# Patient Record
Sex: MALE | Race: ASIAN | HISPANIC OR LATINO | Employment: STUDENT | URBAN - METROPOLITAN AREA
[De-identification: names, ages, dates, MRNs, and addresses within clinical notes are randomized per-mention and may not be internally consistent; named-entity substitution may affect disease eponyms.]

---
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
## 2019-01-02 ENCOUNTER — COMMUNICATION - HEALTHEAST (OUTPATIENT)
Dept: PEDIATRICS | Facility: CLINIC | Age: 19
End: 2019-01-02

## 2019-02-16 ENCOUNTER — COMMUNICATION - HEALTHEAST (OUTPATIENT)
Dept: PEDIATRICS | Facility: CLINIC | Age: 19
End: 2019-02-16

## 2020-01-23 ENCOUNTER — OFFICE VISIT - HEALTHEAST (OUTPATIENT)
Dept: PEDIATRICS | Facility: CLINIC | Age: 20
End: 2020-01-23

## 2020-01-23 DIAGNOSIS — L70.0 ACNE VULGARIS: ICD-10-CM

## 2020-01-23 DIAGNOSIS — Z00.00 ENCOUNTER FOR ANNUAL HEALTH EXAMINATION: ICD-10-CM

## 2020-01-23 DIAGNOSIS — J45.20 MILD INTERMITTENT ASTHMA WITHOUT COMPLICATION: ICD-10-CM

## 2020-01-23 ASSESSMENT — MIFFLIN-ST. JEOR: SCORE: 1435.09

## 2020-11-13 ENCOUNTER — ALLIED HEALTH/NURSE VISIT (OUTPATIENT)
Dept: FAMILY MEDICINE | Facility: CLINIC | Age: 20
End: 2020-11-13

## 2020-11-13 DIAGNOSIS — Z23 NEED FOR VACCINATION: Primary | ICD-10-CM

## 2020-11-13 PROCEDURE — 90471 IMMUNIZATION ADMIN: CPT | Performed by: FAMILY MEDICINE

## 2020-11-13 PROCEDURE — 90686 IIV4 VACC NO PRSV 0.5 ML IM: CPT | Performed by: FAMILY MEDICINE

## 2021-05-25 ENCOUNTER — RECORDS - HEALTHEAST (OUTPATIENT)
Dept: ADMINISTRATIVE | Facility: CLINIC | Age: 21
End: 2021-05-25

## 2021-05-28 ASSESSMENT — ASTHMA QUESTIONNAIRES: ACT_TOTALSCORE: 24

## 2021-05-31 VITALS — BODY MASS INDEX: 20.43 KG/M2 | HEIGHT: 64 IN | WEIGHT: 119.7 LBS

## 2021-06-02 VITALS — WEIGHT: 119 LBS | BODY MASS INDEX: 20.32 KG/M2 | HEIGHT: 64 IN

## 2021-06-04 VITALS
HEIGHT: 64 IN | DIASTOLIC BLOOD PRESSURE: 60 MMHG | BODY MASS INDEX: 19.54 KG/M2 | WEIGHT: 114.44 LBS | SYSTOLIC BLOOD PRESSURE: 102 MMHG

## 2021-06-05 NOTE — PROGRESS NOTES
[facility] Child Check    ASSESSMENT & PLAN  Gurjit Reid is a 19 y.o. who has normal growth and normal development.    Diagnoses and all orders for this visit:    Encounter for annual health examination  -     Hearing Screening  -     Vision Screening    Mild intermittent asthma without complication  -     albuterol (PROAIR HFA;PROVENTIL HFA;VENTOLIN HFA) 90 mcg/actuation inhaler; Inhale 2 puffs every 4 (four) hours as needed for wheezing or shortness of breath (OR COUGH).  Dispense: 1 Inhaler; Refill: 0    Acne vulgaris  -     tretinoin (RETIN-A) 0.025 % cream; Apply to acne-prone areas at bedtime  Dispense: 45 g; Refill: 6        Patient Instructions   We recommend low fat (skim or 1%) milk.    Use the retin A cream once daily at bedtime for acne.  Remember it will take several weeks to see improvement.  The medication may cause redness and/or dryness.    Use albuterol inhaler 2 puffs with spacer up to every 4 hours as needed for cough, wheezing, or shortness of breath.      Return in 1 year.  Establish care with a family medicine or internal medicine provider.             IMMUNIZATIONS/LABS  Immunizations were reviewed and orders were placed as appropriate.    REFERRALS  Dental:  Recommend routine dental care as appropriate.  Other:  No additional referrals were made at this time.    ANTICIPATORY GUIDANCE  I have reviewed age appropriate anticipatory guidance.    HEALTH HISTORY  Do you have any concerns that you'd like to discuss today?: Had consecutive bloody noses in November    Last time he had a bloody nose was over a month ago.  He put vaseline inside the nostrils which seemed to help.      He is not currently using anything for acne, but would like to.    He uses albuterol infrequently for cough or wheezing with colds.  ACT is 23 with no ED or hospital visits for asthma in the past year.      Roomed by: Cecilia         Do you have any significant health concerns in your family history?: No  No family  history on file.  Since your last visit, have there been any major changes in your family, such as a move, job change, separation, divorce, or death in the family?: No  Has a lack of transportation kept you from medical appointments?: No    Home  Who lives in your home?:  Mother, Father, Self.  No pets.  No smokers.  Social History     Social History Narrative     Not on file     Do you have any concerns about losing your housing?: No  Is your housing safe and comfortable?: Yes  Do you have any trouble with sleep?:  No    Education  What school do you child attend?:  [employer]  What grade are you in?:  Sophomore in College  How do you perform in school (grades, behavior, attention, homework?: Pretty Good     Eating  Do you eat regular meals including fruits and vegetables?:  yes  What are you drinking (cow's milk, water, soda, juice, sports drinks, energy drinks, etc)?: cow's milk- whole, water, juice and sports drinks.  Sports drinks occasionally.  Have you been worried that you don't have enough food?: No  Do you have concerns about your body or appearance?:  No    Activities  Do you have friends?:  yes  Do you get at least one hour of physical activity per day?:  yes  How many hours a day are you in front of a screen other than for schoolwork (computer, TV, phone)?: 4  What do you do for exercise?:  Lift, basketball and run   Do you have interest/participate in community activities/volunteers/school sports?:  no    VISION/HEARING  Vision: Completed. See Results  Hearing:  Completed. See Results     Hearing Screening    125Hz 250Hz 500Hz 1000Hz 2000Hz 3000Hz 4000Hz 6000Hz 8000Hz   Right ear:   35 20 20  20 20    Left ear:   35 20 20  20 20       Visual Acuity Screening    Right eye Left eye Both eyes   Without correction:      With correction: 10/10 10/10 10/8       MENTAL HEALTH SCREENING  Social-emotional & mental health screening: Pediatric Symptom Checklist-Youth PASS (<30 pass), no followup  "necessary  No concerns    TB Risk Assessment:  The patient and/or parent/guardian answer positive to:  parents born outside of the [place]  self or family member has traveled outside of the [place] in the past 12 months    Dyslipidemia Risk Screening  Have either of your parents or any of your grandparents had a stroke or heart attack before age 55?: No  Any parents with high cholesterol or currently taking medications to treat?: Yes: Maybe Mother     Dental  When was the last time you saw the dentist?: 1-3 months ago   Parent/Guardian declines the fluoride varnish application today. Fluoride not applied today.    Patient Active Problem List   Diagnosis     Short Stature     Eczema     Allergic Rhinitis     Asthma     Acne vulgaris       Drugs  Does the patient use tobacco/alcohol/drugs?:  denies    Safety  Does the patient have any safety concerns (peer or home)?:  no  Does the patient use safety belts, helmets and other safety equipment?:  yes    Sex  Have you ever had sex?:  No    MEASUREMENTS  Height:  5' 4\" (1.626 m)  Weight: 114 lb 7 oz (51.9 kg)  BMI: Body mass index is 19.64 kg/m .  Blood Pressure: 102/60  Growth percentile SmartLinks can only be used for patients less than 20 years old.    PHYSICAL EXAM  Gen: Alert, awake, well appearing  Head: Normocephalic, atraumatic, age-appropriate fontanelles  Eyes: Red reflex present bilaterally. EOMI.  Pupils equally round and reactive to light. Conjunctivae and cornea clear  Ears: Right TM clear.  Left TM clear.  Nose:  no rhinorrhea.  Throat:  Oropharynx clear.  Tonsils normal.  Neck: Supple.  No adenopathy.  Heart: Regular rate and rhythm; normal S1 and S2; no murmurs, gallops, or rubs.  Lungs: Unlabored respirations; symmetric chest expansion; clear breath sounds.  Abdomen: Soft, without organomegaly. Bowel sounds normal. Nontender without rebound. No masses palpable. No distention.  Genitalia: Normal male external genitalia. Geovanni stage 5  Extremities: No clubbing, " cyanosis, or edema. Normal upper and lower extremities.  Skin: Normal turgor and without lesions except a few acneiform papules on face.  Mental Status: Alert, oriented, in no distress. Appropriate for age.  Neuro: Normal reflexes; normal tone; no focal deficits appreciated. Appropriate for age.  Spine:  straight

## 2021-06-05 NOTE — PATIENT INSTRUCTIONS - HE
We recommend low fat (skim or 1%) milk.    Use the retin A cream once daily at bedtime for acne.  Remember it will take several weeks to see improvement.  The medication may cause redness and/or dryness.    Use albuterol inhaler 2 puffs with spacer up to every 4 hours as needed for cough, wheezing, or shortness of breath.      Return in 1 year.  Establish care with a family medicine or internal medicine provider.

## 2021-06-14 NOTE — PROGRESS NOTES
[facility] Child Check    ASSESSMENT & PLAN  Gurjit Reid is a 17 y.o. who has normal growth and normal development.    Diagnoses and all orders for this visit:    Encounter for routine child health examination without abnormal findings  -     Hearing Screening  -     Influenza, Seasonal Quad, Preservative Free 36+ Months (syringe)  -     Vision Screening    Asthma in pediatric patient, mild intermittent, uncomplicated  -     albuterol (PROAIR HFA) 90 mcg/actuation inhaler; Inhale 2 puffs every 4 (four) hours as needed for wheezing or shortness of breath (or cough).  Dispense: 8.5 g; Refill: 1    Acne vulgaris        Patient Instructions   For mild acne, I recommend the following:    Wash face twice daily with a mild cleanser such as Cetaphil.    Apply benzoyl peroxide cream to acne prone areas once daily at bedtime.    Allow 4 to 6 weeks to see maximum benefit of any acne treatment.    May increase to twice daily application if needed and tolerated.    Benzoyl peroxide can cause redness, dryness, and peeling of skin.  Avoid sensitive areas (around mouth and eyes and by corners of nose).  Avoid getting the medication on fabrics as it will cause bleaching.    See updated asthma action plan.    Return annually for well care.        IMMUNIZATIONS/LABS  Immunizations were reviewed and orders were placed as appropriate.    REFERRALS  Dental:  Recommend routine dental care as appropriate.  Other:  No additional referrals were made at this time.    ANTICIPATORY GUIDANCE  I have reviewed age appropriate anticipatory guidance.    HEALTH HISTORY  Do you have any concerns that you'd like to discuss today?: No concerns .  He uses albuterol inhaler once every few months for wheezing or coughing.  No nighttime cough.  No ED or urgent care visits in the past 12 months.      Roomed by: Nadia    Accompanied by Mother    Refills needed? Yes        Do you have any significant health concerns in your family history?: No  No  family history on file.  Since your last visit, have there been any major changes in your family, such as a move, job change, separation, divorce, or death in the family?: No    Home  Who lives in your home?:  Mom, dad, no pets.  No smokers.  Social History     Social History Narrative     Do you have any trouble with sleep?:  No    Education  What school does your child attend?:  [employer]   What grade is your child in?:  12th  How does the patient perform in school (grades, behavior, attention, homework?: good  .  Plans college after HS.  Possibly [employer] or [employer].    Eating  Does patient eat regular meals including fruits and vegetables?:  Sometimes   What is the patient drinking (cow's milk, water, soda, juice, sports drinks, energy drinks, etc)?: cow's milk- 1%, water, soda, juice and sports drinks.  More than one sugary drink per day.  Does patient have concerns about body or appearance?:  No    Activities  Does the patient have friends?:  yes  Does the patient get at least one hour of physical activity per day?:  yes  Does the patient have less than 2 hours of screen time per day (aside from homework)?:  no  What does your child do for exercise?:  Weight Lifting, Basketball   Does the patient have interest/participate in community activities/volunteers/school sports?:  no    MENTAL HEALTH SCREENING  PHQ-2 Total Score: 0 (11/22/2017 11:00 AM)  No Data Recorded    VISION/HEARING  Vision: Patient is already followed by a vision specialist  Hearing:  Completed. See Results     Hearing Screening    125Hz 250Hz 500Hz 1000Hz 2000Hz 3000Hz 4000Hz 6000Hz 8000Hz   Right ear:   20 20 20  20     Left ear:   20 20 20  20        Visual Acuity Screening    Right eye Left eye Both eyes   Without correction: 10/12.5 10/12.5    With correction:      Comments: Plus lens- n/a      TB Risk Assessment:  The patient and/or parent/guardian answer positive to:  parents born outside of the [place]  self or family member has traveled  "outside of the [place] in the past 12 months.  Went [place].  Mom went to [facility].    Dental  Is your child being seen by a dentist?  Yes  Flouride Varnish Application Screening  Is child seen by dentist?     Yes    Patient Active Problem List   Diagnosis     Short Stature     Eczema     Allergic Rhinitis     Mild Intermittent Asthma     Asthma     Acne vulgaris       Drugs  Does the patient use tobacco/alcohol/drugs?:  no    Safety  Does the patient have any safety concerns (peer or home)?:  no  Does the patient use safety belts, helmets and other safety equipment?:  yes    Sex  Is the patient sexually active?:  no    MEASUREMENTS  Height:  5' 3.5\" (1.613 m)  Weight: 119 lb 11.2 oz (54.3 kg)  BMI: Body mass index is 20.87 kg/(m^2).  Blood Pressure: 110/70  Blood pressure percentiles are 29 % systolic and 56 % diastolic based on NHBPEP's 4th Report. Blood pressure percentile targets: 90: 130/83, 95: 134/87, 99 + 5 mmH/100.    PHYSICAL EXAM  Gen: Alert, awake, well appearing  Head: Normocephalic, atraumatic, age-appropriate fontanelles  Eyes: Red reflex present bilaterally. EOMI.  Pupils equally round and reactive to light. Conjunctivae and cornea clear  Ears: Right TM clear.  Left TM clear.  Nose:  no rhinorrhea.  Throat:  Oropharynx clear.  Tonsils normal.  Neck: Supple.  No adenopathy.  Heart: Regular rate and rhythm; normal S1 and S2; no murmurs, gallops, or rubs.  Lungs: Unlabored respirations; symmetric chest expansion; clear breath sounds.  Abdomen: Soft, without organomegaly. Bowel sounds normal. Nontender without rebound. No masses palpable. No distention.  Genitalia: Normal male external genitalia. Geovanni stage 5  Extremities: No clubbing, cyanosis, or edema. Normal upper and lower extremities.  Skin: Normal turgor and without lesions except scattered acneiform papules on forehead, cheeks, chin  Mental Status: Alert, oriented, in no distress. Appropriate for age.  Neuro: Normal reflexes; normal tone; no " focal deficits appreciated. Appropriate for age.  Spine:  straight

## 2021-06-20 NOTE — LETTER
Letter by Shabbir Moreno MD at      Author: Shabbir Moreno MD Service: -- Author Type: --    Filed:  Encounter Date: 1/23/2020 Status: (Other)       My Asthma Action Plan     Name: Gurjit Reid   YOB: 2000  Date: 1/23/2020   My doctor: Shabbir Moreno MD   My clinic: [facility]        My Rescue Medicine:   Albuterol (Proair/Ventolin/Proventil HFA) 2-4 puffs EVERY 4 HOURS as needed. Use a spacer if recommended by your provider.   My Asthma Severity:   Intermittent/Exercise Induced  Know your asthma triggers: upper respiratory infections             GREEN ZONE   Good Control    I feel good    No cough or wheeze    Can work, sleep and play without asthma symptoms     Take your asthma control medicine every day.     1. If exercise triggers your asthma, take your rescue medication    15 minutes before exercise or sports, and    During exercise if you have asthma symptoms  2. Spacer to use with inhaler: If you have a spacer, make sure to use it with your inhaler             YELLOW ZONE Getting Worse  I have ANY of these:    I do not feel good    Cough or wheeze    Chest feels tight    Wake up at night 1. Keep taking your Green Zone medications  2. Start taking your rescue medicine:    every 20 minutes for up to 1 hour. Then every 4 hours for 24-48 hours.  3. If you stay in the Yellow Zone for more than 12-24 hours, contact your doctor.  4. If you do not return to the Green Zone in 12-24 hours or you get worse, start taking your oral steroid medicine if prescribed by your provider.           RED ZONE Medical Alert - Get Help  I have ANY of these:    I feel awful    Medicine is not helping    Breathing getting harder    Trouble walking or talking    Nose opens wide to breathe     1. Take your rescue medicine NOW  2. If your provider has prescribed an oral steroid medicine, start taking it NOW  3. Call your doctor NOW  4. If you are still in the Red Zone after 20 minutes and you have not reached  your doctor:    Take your rescue medicine again and    Call 911 or go to the emergency room right away    See your regular doctor within 2 weeks of an Emergency Room or Urgent Care visit for follow-up treatment.          Annual Reminders:  Meet with Asthma Educator,  Flu Shot in the Fall, consider Pneumonia Vaccination for patients with asthma (aged 19 and older).    Pharmacy:   [id number] - [facility] [address] [place] [address] [address] [facility] [address]  [address] [facility] [address]  [facility] [address]  Phone: [phone] Fax: [phone]      Electronically signed by Shabbir Moreno MD   Date: 01/23/20                      Asthma Triggers  How To Control Things That Make Your Asthma Worse    Triggers are things that make your asthma worse.  Look at the list below to help you find your triggers and what you can do about them.  You can help prevent asthma flare-ups by staying away from your triggers.      Trigger                                                          What you can do   Cigarette Smoke  Tobacco smoke can make asthma worse. Do not allow smoking in your home, car or around you.  Be sure no one smokes at a thea day care or school.  If you smoke, ask your health care provider for ways to help you quit.  Ask family members to quit too.  Ask your health care provider for a referral to Quit Plan to help you quit smoking, or call 5-626-223-PLAN.     Colds, Flu, Bronchitis  These are common triggers of asthma. Wash your hands often.  Dont touch your eyes, nose or mouth.  Get a flu shot every year.     Dust Mites  These are tiny bugs that live in cloth or carpet. They are too small to see. Wash sheets and blankets in hot water every week.   Encase pillows and mattress in dust mite proof covers.  Avoid having carpet if you can. If you have carpet, vacuum weekly.   Use a dust mask and HEPA vacuum.   Pollen and Outdoor Mold  Some people are allergic to trees, grass, or weed pollen, or molds. Try to  keep your windows closed.  Limit time out doors when pollen count is high.   Ask you health care provider about taking medicine during allergy season.     Animal Dander  Some people are allergic to skin flakes, urine or saliva from pets with fur or feathers. Keep pets with fur or feathers out of your home.    If you cant keep the pet outdoors, then keep the pet out of your bedroom.  Keep the bedroom door closed.  Keep pets off cloth furniture and away from stuffed toys.     Mice, Rats, and Cockroaches  Some people are allergic to the waste from these pests.   Cover food and garbage.  Clean up spills and food crumbs.  Store grease in the refrigerator.   Keep food out of the bedroom.   Indoor Mold  This can be a trigger if your home has high moisture. Fix leaking faucets, pipes, or other sources of water.   Clean moldy surfaces.  Dehumidify basement if it is damp and smelly.   Smoke, Strong Odors, and Sprays  These can reduce air quality. Stay away from strong odors and sprays, such as perfume, powder, hair spray, paints, smoke incense, paint, cleaning products, candles and new carpet.   Exercise or Sports  Some people with asthma have this trigger. Be active!  Ask your doctor about taking medicine before sports or exercise to prevent symptoms.    Warm up for 5-10 minutes before and after sports or exercise.     Other Triggers of Asthma  Cold air:  Cover your nose and mouth with a scarf.  Sometimes laughing or crying can be a trigger.  Some medicines and food can trigger asthma.

## 2021-06-22 NOTE — PROGRESS NOTES
formerly [facility] Child Check    ASSESSMENT & PLAN  Gurjit Reid is a 18 y.o. who has normal growth and normal development.    Diagnoses and all orders for this visit:    Encounter for routine child health examination without abnormal findings  -     Influenza, Seasonal Quad, Preservative Free 36+ Months (syringe)  -     Lipid Cascade RANDOM  -     Hearing Screening  -     Vision Screening    Asthma in pediatric patient, mild intermittent, uncomplicated  -     albuterol (PROAIR HFA) 90 mcg/actuation inhaler  Dispense: 8.5 g; Refill: 1        Patient Instructions   If you take the oral antibiotic for acne, take it consistently every day until directed otherwise by your dermatologist.  The oral antibiotic would be the most effective treatment for the acne on the back.    We will call with blood test results in a few days.    Return in 1 year for well care and flu vaccine.          IMMUNIZATIONS/LABS  Immunizations were reviewed and orders were placed as appropriate.    REFERRALS  Dental:  Recommend routine dental care as appropriate.  Other:  No additional referrals were made at this time.    ANTICIPATORY GUIDANCE  I have reviewed age appropriate anticipatory guidance.    HEALTH HISTORY  Do you have any concerns that you'd like to discuss today?: No concerns       Roomed by: monica    Accompanied by Father    Refills needed? No        Do you have any significant health concerns in your family history?: No  No family history on file.  Since your last visit, have there been any major changes in your family, such as a move, job change, separation, divorce, or death in the family?: No  Has a lack of transportation kept you from medical appointments?: No    Home  Who lives in your home?:  Mom, dad, no pets.  No smokers.  Social History     Social History Narrative     Not on file     Do you have any concerns about losing your housing?: No  Is your housing safe and comfortable?: Yes  Do you have any trouble with sleep?:   "No    Education  What school do you child attend?:  [employer] .  Doing \"all right\".  What grade are you in?:  college  How do you perform in school (grades, behavior, attention, homework?: good      Eating  Do you eat regular meals including fruits and vegetables?:  yes  What are you drinking (cow's milk, water, soda, juice, sports drinks, energy drinks, etc)?: cow's milk- 1%, cow's milk- whole, water, soda, juice and sports drinks.  Occasional sugary drinks.  Have you been worried that you don't have enough food?: No  Do you have concerns about your body or appearance?:  Yes: height     Activities  Do you have friends?:  yes  Do you get at least one hour of physical activity per day?:  yes  How many hours a day are you in front of a screen other than for schoolwork (computer, TV, phone)?:  4  What do you do for exercise?:  Weight lifting, basketball  Do you have interest/participate in community activities/volunteers/school sports?:  no    MENTAL HEALTH SCREENING  PHQ-2 Total Score: 0 (12/26/2018 10:00 AM)    PHQ-9 Total Score: 2 (12/26/2018 10:00 AM)      VISION/HEARING  Vision: Patient is already followed by a vision specialist  Hearing:  Completed. See Results     Hearing Screening    125Hz 250Hz 500Hz 1000Hz 2000Hz 3000Hz 4000Hz 6000Hz 8000Hz   Right ear:   20 20 20  20 20    Left ear:   20 20 20  20 20       Visual Acuity Screening    Right eye Left eye Both eyes   Without correction:      With correction: 10/12.5 10/12.5 10/12.5   Comments: Plus lens- n/a      TB Risk Assessment:  The patient and/or parent/guardian answer positive to:  parents born outside of the [place]  self or family member has traveled outside of the [place] in the past 12 months    Dyslipidemia Risk Screening  Have either of your parents or any of your grandparents had a stroke or heart attack before age 55?: No  Any parents with high cholesterol or currently taking medications to treat?: No     Dental  When was the last time you saw the dentist?: 3-6 " "months ago   Parent/Guardian declines the fluoride varnish application today. Fluoride not applied today.    Patient Active Problem List   Diagnosis     Short Stature     Eczema     Allergic Rhinitis     Mild Intermittent Asthma     Acne vulgaris       Drugs  Does the patient use tobacco/alcohol/drugs?:  No tobacco, alcohol sporadically, denies drug use    Safety  Does the patient have any safety concerns (peer or home)?:  no  Does the patient use safety belts, helmets and other safety equipment?:  yes    Sex  Have you ever had sex?:  No    MEASUREMENTS  Height:  5' 3.5\" (1.613 m)  Weight: 119 lb (54 kg)  BMI: Body mass index is 20.75 kg/m .  Blood Pressure: 112/74  Blood pressure percentiles are 35 % systolic and 80 % diastolic based on the 2017 AAP Clinical Practice Guideline. Blood pressure percentile targets: 90: 130/78, 95: 134/81, 95 + 12 mmH/93.    PHYSICAL EXAM  Gen: Alert, awake, well appearing  Head: Normocephalic, atraumatic, age-appropriate fontanelles  Eyes: Red reflex present bilaterally. EOMI.  Pupils equally round and reactive to light. Conjunctivae and cornea clear  Ears: Right TM clear.  Left TM clear.  Nose:  no rhinorrhea.  Throat:  Oropharynx clear.  Tonsils normal.  Neck: Supple.  No adenopathy.  Heart: Regular rate and rhythm; normal S1 and S2; no murmurs, gallops, or rubs.  Lungs: Unlabored respirations; symmetric chest expansion; clear breath sounds.  Abdomen: Soft, without organomegaly. Bowel sounds normal. Nontender without rebound. No masses palpable. No distention.  Genitalia: Normal male external genitalia. Geovanni stage 4  Extremities: No clubbing, cyanosis, or edema. Normal upper and lower extremities.  Skin: Normal turgor.  Acneiform papules on back.  Mental Status: Alert, oriented, in no distress. Appropriate for age.  Neuro: Normal reflexes; normal tone; no focal deficits appreciated. Appropriate for age.  Spine:  straight      "

## 2021-06-22 NOTE — TELEPHONE ENCOUNTER
I prescribed albuterol which is generic, and is the active ingredient in both ventolin and proair.    Please notify pharmacy I did send a new prescription for albuterol, but I am not sure why they could not dispense the original prescription.

## 2021-07-25 ENCOUNTER — HEALTH MAINTENANCE LETTER (OUTPATIENT)
Age: 21
End: 2021-07-25

## 2021-09-19 ENCOUNTER — HEALTH MAINTENANCE LETTER (OUTPATIENT)
Age: 21
End: 2021-09-19

## 2022-08-21 ENCOUNTER — HEALTH MAINTENANCE LETTER (OUTPATIENT)
Age: 22
End: 2022-08-21

## 2022-11-21 ENCOUNTER — HEALTH MAINTENANCE LETTER (OUTPATIENT)
Age: 22
End: 2022-11-21

## 2023-09-17 ENCOUNTER — HEALTH MAINTENANCE LETTER (OUTPATIENT)
Age: 23
End: 2023-09-17

## 2023-09-21 ENCOUNTER — OFFICE VISIT (OUTPATIENT)
Dept: FAMILY MEDICINE | Facility: CLINIC | Age: 23
End: 2023-09-21

## 2023-09-21 VITALS
HEIGHT: 65 IN | OXYGEN SATURATION: 99 % | DIASTOLIC BLOOD PRESSURE: 78 MMHG | HEART RATE: 84 BPM | BODY MASS INDEX: 17.99 KG/M2 | SYSTOLIC BLOOD PRESSURE: 104 MMHG | WEIGHT: 108 LBS

## 2023-09-21 DIAGNOSIS — Z13.0 SCREENING FOR ENDOCRINE, METABOLIC AND IMMUNITY DISORDER: ICD-10-CM

## 2023-09-21 DIAGNOSIS — Z23 NEED FOR VACCINATION: ICD-10-CM

## 2023-09-21 DIAGNOSIS — Z00.00 ROUTINE GENERAL MEDICAL EXAMINATION AT A HEALTH CARE FACILITY: Primary | ICD-10-CM

## 2023-09-21 DIAGNOSIS — Z11.59 ENCOUNTER FOR HEPATITIS C SCREENING TEST FOR LOW RISK PATIENT: ICD-10-CM

## 2023-09-21 DIAGNOSIS — Z13.29 SCREENING FOR ENDOCRINE, METABOLIC AND IMMUNITY DISORDER: ICD-10-CM

## 2023-09-21 DIAGNOSIS — Z11.4 SCREENING FOR HIV (HUMAN IMMUNODEFICIENCY VIRUS): ICD-10-CM

## 2023-09-21 DIAGNOSIS — Z13.220 SCREENING FOR LIPOID DISORDERS: ICD-10-CM

## 2023-09-21 DIAGNOSIS — Z13.228 SCREENING FOR ENDOCRINE, METABOLIC AND IMMUNITY DISORDER: ICD-10-CM

## 2023-09-21 PROBLEM — L70.0 ACNE VULGARIS: Status: ACTIVE | Noted: 2017-11-22

## 2023-09-21 PROBLEM — J45.909 ASTHMA: Status: ACTIVE | Noted: 2023-09-21

## 2023-09-21 PROBLEM — J30.9 ALLERGIC RHINITIS: Status: ACTIVE | Noted: 2023-09-21

## 2023-09-21 LAB
ALBUMIN SERPL-MCNC: 4.5 G/DL (ref 3.6–5.1)
ALBUMIN/GLOB SERPL: 1.6 {RATIO} (ref 1–2.5)
ALP SERPL-CCNC: 61 U/L (ref 33–130)
ALT 1742-6: 36 U/L (ref 0–32)
AST 1920-8: 22 U/L (ref 0–35)
BILIRUB SERPL-MCNC: 1 MG/DL (ref 0.2–1.2)
BUN SERPL-MCNC: 14 MG/DL (ref 7–25)
BUN/CREATININE RATIO: 13.5 (ref 6–32)
CALCIUM SERPL-MCNC: 9.2 MG/DL (ref 8.6–10.3)
CHLORIDE SERPLBLD-SCNC: 105.7 MMOL/L (ref 98–110)
CHOLEST SERPL-MCNC: 153 MG/DL (ref 0–199)
CHOLEST/HDLC SERPL: 3 {RATIO} (ref 0–5)
CO2 SERPL-SCNC: 30.1 MMOL/L (ref 20–32)
CREAT SERPL-MCNC: 1.04 MG/DL (ref 0.6–1.3)
GLOBULIN, CALCULATED - QUEST: 2.8 (ref 1.9–3.7)
GLUCOSE SERPL-MCNC: 80 MG/DL (ref 60–99)
HDLC SERPL-MCNC: 59 MG/DL (ref 40–150)
LDLC SERPL CALC-MCNC: 79 MG/DL (ref 0–130)
POTASSIUM SERPL-SCNC: 3.89 MMOL/L (ref 3.5–5.3)
PROT SERPL-MCNC: 7.3 G/DL (ref 6.1–8.1)
SODIUM SERPL-SCNC: 142.4 MMOL/L (ref 135–146)
TRIGL SERPL-MCNC: 77 MG/DL (ref 0–149)

## 2023-09-21 PROCEDURE — 80061 LIPID PANEL: CPT | Performed by: PHYSICIAN ASSISTANT

## 2023-09-21 PROCEDURE — 99395 PREV VISIT EST AGE 18-39: CPT | Mod: 25 | Performed by: PHYSICIAN ASSISTANT

## 2023-09-21 PROCEDURE — 36415 COLL VENOUS BLD VENIPUNCTURE: CPT | Performed by: PHYSICIAN ASSISTANT

## 2023-09-21 PROCEDURE — 90471 IMMUNIZATION ADMIN: CPT | Performed by: PHYSICIAN ASSISTANT

## 2023-09-21 PROCEDURE — 90715 TDAP VACCINE 7 YRS/> IM: CPT | Performed by: PHYSICIAN ASSISTANT

## 2023-09-21 PROCEDURE — 80053 COMPREHEN METABOLIC PANEL: CPT | Performed by: PHYSICIAN ASSISTANT

## 2023-09-21 NOTE — PROGRESS NOTES
SUBJECTIVE:   CC: Gurjit is an 22 year old who presents for preventative health visit.     HPI    Diet-good, possibly low calories. See below for weight loss  Exercise-active with work, walking with dog, gym on occasion  Sleep-8-10hrs/night  BM-daily  Vitamin Use-multivitamin  Dentist-2x a year  Eye Doctor-regularly      Works at [employer]-very physical job. 6lb weight loss over last 3 years. Admits he doesn't eat as much as he should. Weighed 140lbs in high school, 120lbs in college. Some night sweats-off and on over last 5 years. No abd pain. He will monitor weight loss and RTC if he loses more weight.    Hx asthma-childhood. Has grown out of this.     Social History     Tobacco Use    Smoking status: Never     Passive exposure: Never    Smokeless tobacco: Never    Tobacco comments:     no smokers in home   Substance Use Topics    Alcohol use: Not Currently              No data to display                Last PSA: No results found for: PSA    Reviewed orders with patient. Reviewed health maintenance and updated orders accordingly - Yes  Lab work is in process    Reviewed and updated as needed this visit by clinical staff   Tobacco  Allergies  Meds      Soc Hx        Reviewed and updated as needed this visit by Provider   Tobacco        Soc Hx       Past Medical History:   Diagnosis Date    Asthma       Past Surgical History:   Procedure Laterality Date    NO HISTORY OF SURGERY         Review of Systems  CONSTITUTIONAL: NEGATIVE for fever, chills, change in weight  INTEGUMENTARY/SKIN: NEGATIVE for worrisome rashes, moles or lesions  EYES: NEGATIVE for vision changes or irritation  ENT: NEGATIVE for ear, mouth and throat problems  RESP: NEGATIVE for significant cough or SOB  CV: NEGATIVE for chest pain, palpitations or peripheral edema  GI: NEGATIVE for nausea, abdominal pain, heartburn, or change in bowel habits   male: negative for dysuria, hematuria, decreased urinary stream, erectile dysfunction, urethral  "discharge  MUSCULOSKELETAL: NEGATIVE for significant arthralgias or myalgia  NEURO: NEGATIVE for weakness, dizziness or paresthesias  PSYCHIATRIC: NEGATIVE for changes in mood or affect    OBJECTIVE:   /78   Pulse 84   Ht 1.651 m (5' 5\")   Wt 49 kg (108 lb)   SpO2 99%   BMI 17.97 kg/m      Physical Exam  GENERAL: healthy, alert and no distress  EYES: Eyes grossly normal to inspection, PERRL and conjunctivae and sclerae normal  HENT: ear canals and TM's normal, nose and mouth without ulcers or lesions  NECK: no adenopathy, no asymmetry, masses, or scars and thyroid normal to palpation  RESP: lungs clear to auscultation - no rales, rhonchi or wheezes  CV: regular rate and rhythm, normal S1 S2, no S3 or S4, no murmur, click or rub, no peripheral edema and peripheral pulses strong  ABDOMEN: soft, nontender, no hepatosplenomegaly, no masses and bowel sounds normal   (male): normal male genitalia without lesions or urethral discharge, no hernia  MS: no gross musculoskeletal defects noted, no edema  NEURO: Normal strength and tone, mentation intact and speech normal    Diagnostic Test Results:  Labs reviewed in Epic    ASSESSMENT/PLAN:       ICD-10-CM    1. Routine general medical examination at a health care facility  Z00.00 TDAP VACCINE (Adacel, Boostrix)  [[id number]]     VENOUS COLLECTION     Comprehensive Metobolic Panel ([facility])     Lipid Panel ([facility])     HIV 1/2 Agn Karen 4th Gen w Reflex ([facility])     HEPATITIS C ANTIBODY ([facility])      2. Screening for lipoid disorders  Z13.220 VENOUS COLLECTION     Lipid Panel ([facility])      3. Screening for endocrine, metabolic and immunity disorder  Z13.29 VENOUS COLLECTION    Z13.228 Comprehensive Metobolic Panel ([facility])    Z13.0       4. Encounter for hepatitis C screening test for low risk patient  Z11.59 VENOUS COLLECTION     HEPATITIS C ANTIBODY ([facility])      5. Screening for HIV (human immunodeficiency virus)  Z11.4 VENOUS COLLECTION     HIV 1/2 Agn Karen 4th Gen w Reflex ([facility]) "      6. Need for vaccination  Z23 TDAP VACCINE (Adacel, Boostrix)  [[id number]]        Weight loss: suspect due to low calories, if pt loses any more weight he will RTC to address.  -Increase calories when working    Patient has been advised of split billing requirements and indicates understanding: Yes      COUNSELING:   Reviewed preventive health counseling, as reflected in patient instructions       Regular exercise       Healthy diet/nutrition       Vision screening       Safe sex practices/STD prevention       Consider Hep C screening for all patients one time for ages 18-79 years       HIV screeninx in teen years, 1x in adult years, and at intervals if high risk        He reports that he has never smoked. He has never been exposed to tobacco smoke. He has never used smokeless tobacco.            JUWAN BernalC  [facility]

## 2023-09-22 LAB
HCV AB - QUEST: NORMAL
HIV 1/2 AGN ABY 4TH GEN WITH REFLEX: NORMAL

## 2024-11-09 ENCOUNTER — HEALTH MAINTENANCE LETTER (OUTPATIENT)
Age: 24
End: 2024-11-09

## 2025-02-13 ENCOUNTER — OFFICE VISIT (OUTPATIENT)
Dept: FAMILY MEDICINE | Facility: CLINIC | Age: 25
End: 2025-02-13

## 2025-02-13 VITALS
HEIGHT: 65 IN | SYSTOLIC BLOOD PRESSURE: 108 MMHG | DIASTOLIC BLOOD PRESSURE: 60 MMHG | BODY MASS INDEX: 19.16 KG/M2 | WEIGHT: 115 LBS | TEMPERATURE: 97.4 F | HEART RATE: 80 BPM | RESPIRATION RATE: 20 BRPM

## 2025-02-13 DIAGNOSIS — R79.89 ELEVATED LFTS: ICD-10-CM

## 2025-02-13 DIAGNOSIS — Z00.00 ROUTINE GENERAL MEDICAL EXAMINATION AT A HEALTH CARE FACILITY: Primary | ICD-10-CM

## 2025-02-13 ASSESSMENT — ASTHMA QUESTIONNAIRES: ACT_TOTALSCORE: 25

## 2025-02-13 NOTE — PROGRESS NOTES
"3  SUBJECTIVE:   CC: Gurjit Reid is an 24 year old male who presents for preventive health visit.       Patient has been advised of split billing requirements and indicates understanding: Yes  Healthy Habits:  Do you get at least three servings of calcium containing foods daily (dairy, green leafy vegetables, etc.)? yes  Amount of exercise or daily activities, at work: 5 day(s) per week  Problems taking medications regularly No  Medication side effects: No  Have you had an eye exam in the past two years? yes  Do you see a dentist twice per year? no  Do you have sleep apnea, excessive snoring or daytime drowsiness?no          Today's PHQ-2 Score:       2/13/2025     9:23 AM 9/21/2023     9:19 AM   PHQ-2 ( 1999 Pfizer)   Q1: Little interest or pleasure in doing things 0 0   Q2: Feeling down, depressed or hopeless 0 0   PHQ-2 Score 0 0       Abuse: Current or Past(Physical, Sexual or Emotional)- No  Do you feel safe in your environment? Yes        Social History     Tobacco Use    Smoking status: Never     Passive exposure: Never    Smokeless tobacco: Never    Tobacco comments:     no smokers in home   Substance Use Topics    Alcohol use: Not Currently     If you drink alcohol do you typically have >3 drinks per day or >7 drinks per week? No                      Last PSA: No results found for: \"PSA\"    Reviewed orders with patient. Reviewed health maintenance and updated orders accordingly - Yes  BP Readings from Last 3 Encounters:   02/13/25 108/60   09/21/23 104/78   01/23/20 102/60    Wt Readings from Last 3 Encounters:   02/13/25 52.2 kg (115 lb)   09/21/23 49 kg (108 lb)   01/23/20 51.9 kg (114 lb 7 oz)                  Patient Active Problem List   Diagnosis    Allergic rhinitis    Acne vulgaris     Past Surgical History:   Procedure Laterality Date    NO HISTORY OF SURGERY         Social History     Tobacco Use    Smoking status: Never     Passive exposure: Never    Smokeless tobacco: Never    Tobacco " "comments:     no smokers in home   Substance Use Topics    Alcohol use: Not Currently     Comment: rare     Family History   Problem Relation Age of Onset    C.A.D. No family hx of     Diabetes No family hx of     Hypertension No family hx of     Breast Cancer No family hx of     Cancer - colorectal No family hx of          No current outpatient medications on file.     No Known Allergies  Recent Labs   Lab Test 09/21/23  0000 12/26/18  1042   LDL 79 87   HDL 59 51   TRIG 77 120   CR 1.04  --    POTASSIUM 3.89  --         Reviewed and updated as needed this visit by clinical staff   Tobacco  Allergies  Meds  Problems  Med Hx  Surg Hx           Reviewed and updated as needed this visit by Provider                  Past Medical History:   Diagnosis Date    Asthma       Past Surgical History:   Procedure Laterality Date    NO HISTORY OF SURGERY         ROS:  CONSTITUTIONAL: NEGATIVE for fever, chills, change in weight  INTEGUMENTARY/SKIN: NEGATIVE for worrisome rashes, moles or lesions  EYES: NEGATIVE for vision changes or irritation  ENT: NEGATIVE for ear, mouth and throat problems  RESP: NEGATIVE for significant cough or SOB  CV: NEGATIVE for chest pain, palpitations or peripheral edema  GI: NEGATIVE for nausea, abdominal pain, heartburn, or change in bowel habits   male: negative for dysuria, hematuria, decreased urinary stream, erectile dysfunction, urethral discharge  MUSCULOSKELETAL: NEGATIVE for significant arthralgias or myalgia  NEURO: NEGATIVE for weakness, dizziness or paresthesias  PSYCHIATRIC: NEGATIVE for changes in mood or affect    OBJECTIVE:   /60 (BP Location: Right arm, Patient Position: Chair, Cuff Size: Adult Regular)   Pulse 80   Temp 97.4  F (36.3  C) (Temporal)   Resp 20   Ht 1.651 m (5' 5\")   Wt 52.2 kg (115 lb)   BMI 19.14 kg/m    EXAM:  GENERAL: alert and no distress  EYES: Eyes grossly normal to inspection, PERRL and conjunctivae and sclerae normal  HENT: ear canals and " "TM's normal, nose and mouth without ulcers or lesions  NECK: no adenopathy, no asymmetry, masses, or scars  RESP: lungs clear to auscultation - no rales, rhonchi or wheezes  CV: regular rate and rhythm, normal S1 S2, no S3 or S4, no murmur, click or rub, no peripheral edema  ABDOMEN: soft, nontender, no hepatosplenomegaly, no masses and bowel sounds normal   (male): normal male genitalia without lesions or urethral discharge, no hernia  MS: no gross musculoskeletal defects noted, no edema  SKIN: no suspicious lesions or rashes  NEURO: Normal strength and tone, mentation intact and speech normal  PSYCH: mentation appears normal, affect normal/bright    Diagnostic Test Results:  Labs reviewed in Epic    ASSESSMENT/PLAN:   (Z00.00) Routine general medical examination at a health care facility  (primary encounter diagnosis)  Comment: discussed preventitive healthcare   Plan: Continue to work on healthy diet and exercise, discussed healthy habits     (R79.89) Elevated LFTs  Comment: likely mild fatty liver-declines labs today  Plan: Continue to work on healthy diet and exercise, discussed healthy habits     Patient has been advised of split billing requirements and indicates understanding: Yes  COUNSELING:  Reviewed preventive health counseling, as reflected in patient instructions       Regular exercise       Healthy diet/nutrition       Vision screening       Immunizations  Plans to get flu in next week      Estimated body mass index is 19.14 kg/m  as calculated from the following:    Height as of this encounter: 1.651 m (5' 5\").    Weight as of this encounter: 52.2 kg (115 lb).        He reports that he has never smoked. He has never been exposed to tobacco smoke. He has never used smokeless tobacco.      Counseling Resources:  ATP IV Guidelines  Pooled Cohorts Equation Calculator  FRAX Risk Assessment  ICSI Preventive Guidelines  Dietary Guidelines for Americans, 2010  USDA's MyPlate  ASA Prophylaxis  Lung CA " Screening    Raymond Payton MD  [facility]

## 2025-02-13 NOTE — NURSING NOTE
Gurjit Reid is here for a CPX.    Pre-visit planning  Immunizations -up to date  Colonoscopy -  Mammogram -  Asthma test --  PHQ9 -  HAMZAH 7 -      Questioned patient about current smoking habits.  Pt. has never smoked.  Body mass index is 19.14 kg/m .  PULSE regular  My Chart: active  CLASSIFICATION OF OVERWEIGHT AND OBESITY BY BMI                        Obesity Class           BMI(kg/m2)  Underweight                                    < 18.5  Normal                                         18.5-24.9  Overweight                                     25.0-29.9  OBESITY                     I                  30.0-34.9                             II                 35.0-39.9  EXTREME OBESITY             III                >40                            Patient's  BMI Body mass index is 19.14 kg/m .      The patient has verbalized that it is ok to leave a detailed voice message on the patient's cell phone with results/recommendations from this visit.